# Patient Record
(demographics unavailable — no encounter records)

---

## 2025-01-31 NOTE — HISTORY OF PRESENT ILLNESS
[Mother] : mother [Father] : father [Well-balanced] : well-balanced [Fruit] : fruit [Cereal] : cereal [Eggs] : eggs [Water] : water [Normal] : Normal [Sippy Cup use] : sippy cup use [Water heater temperature set at <120 degrees F] : Water heater temperature set at <120 degrees F [Rear facing car seat in  back seat] : Rear facing car seat in  back seat [Carbon Monoxide Detectors] : Carbon monoxide detectors [Smoke Detectors] : Smoke detectors [Peanut] : no peanut [In Crib] : sleeps in crib [On back] : sleeps on back [Co-sleeping] : no co-sleeping [Wakes up at night] : does not wake up at night [Sleeps 12-16 hours per 24 hours (including naps)] : sleeps 12-16 hours per 24 hours (including naps) [Loose bedding, pillow, toys, and/or bumpers in crib] : no loose bedding, pillow, toys, and/or bumpers in crib [Brushing teeth] : brushing teeth [Vitamin] : Primary Fluoride Source: Vitamin [No] : Not at  exposure [Up to date] : Up to date [FreeTextEntry7] : 9m well visit [de-identified] : breast feeding ad [facility] [de-identified] : water with sippy cup  [FreeTextEntry1] : No specialists

## 2025-01-31 NOTE — PLAN
[TextEntry] : Continue breast milk or formula as desired. Increase table foods and with family mealtimes, 3 meals with 2-3 snacks per day. Discussed weaning of bottle and pacifier.   When in car, patient should be in rear-facing car seat in back seat, do not leave baby in car for any reason or any time. Put baby to sleep on back, in own crib with no loose or soft bedding, no co-sleeping. Can use sunscreen if if outside, do not put baby in direct sunlight. Help baby to develop consistent sleep and feeding routines. May offer pacifier if needed. Avoid screen/tv time at this age. Wash hands often, especially after changing diaper. Keep tobacco/vape free household and environment. Encourage family/caregivers to get protective vaccinations. Use sunscreen when outside, avoid direct sunlight. Recognize stranger anxiety. Ensure home is safe due to baby being increasingly mobile. Do not leave baby alone. Use consistent, positive discipline. Read and sing aloud to baby.   No propping bottles, and no bottles in bed, wash/clean teeth with wash cloth or a soft toothbrush Do not give baby honey. Avoid sweetened drinks/juices. Avoid choking by limiting finger foods to smaller than a cheerio. No hard/compressible foods. start daily multivitamin with fluoride.    Return for 12 month well visit or sooner if needed or if concerns arise.

## 2025-01-31 NOTE — DISCUSSION/SUMMARY
[Normal Growth] : growth [Normal Development] : development [None] : No known medical problems [No Elimination Concerns] : elimination [No Feeding Concerns] : feeding [No Skin Concerns] : skin [Normal Sleep Pattern] : sleep [Family Adaptation] : family adaptation [Infant [place]] : infant independence [Feeding Routine] : feeding routine [Safety] : safety [No Medications] : ~He/She~ is not on any medications [Parent/Guardian] : parent/guardian [FreeTextEntry1] : defer covid

## 2025-01-31 NOTE — HISTORY OF PRESENT ILLNESS
[Mother] : mother [Father] : father [Well-balanced] : well-balanced [Fruit] : fruit [Cereal] : cereal [Eggs] : eggs [Water] : water [Normal] : Normal [Sippy Cup use] : sippy cup use [Water heater temperature set at <120 degrees F] : Water heater temperature set at <120 degrees F [Rear facing car seat in  back seat] : Rear facing car seat in  back seat [Carbon Monoxide Detectors] : Carbon monoxide detectors [Smoke Detectors] : Smoke detectors [Peanut] : no peanut [In Crib] : sleeps in crib [On back] : sleeps on back [Co-sleeping] : no co-sleeping [Wakes up at night] : does not wake up at night [Sleeps 12-16 hours per 24 hours (including naps)] : sleeps 12-16 hours per 24 hours (including naps) [Loose bedding, pillow, toys, and/or bumpers in crib] : no loose bedding, pillow, toys, and/or bumpers in crib [Brushing teeth] : brushing teeth [Vitamin] : Primary Fluoride Source: Vitamin [No] : Not at  exposure [Up to date] : Up to date [FreeTextEntry7] : 9m well visit [de-identified] : breast feeding ad [facility] [de-identified] : water with sippy cup  [FreeTextEntry1] : No specialists

## 2025-01-31 NOTE — PHYSICAL EXAM
[Alert] : alert [Acute Distress] : no acute distress [Consolable] : consolable [Playful] : playful [Normocephalic] : normocephalic [Flat Open Anterior [place]] : flat open anterior fontanelle [Red Reflex] : red reflex bilateral [Excessive Tearing] : no excessive tearing [PERRL] : PERRL [Normally Placed Ears] : normally placed ears [Auricles Well Formed] : auricles well formed [Clear Tympanic membranes] : clear tympanic membranes [Light reflex present] : light reflex present [Bony landmarks visible] : bony landmarks visible [Discharge] : no discharge [Nares Patent] : nares patent [Palate Intact] : palate intact [Uvula Midline] : uvula midline [Supple, full passive range of motion] : supple, full passive range of motion [Palpable Masses] : no palpable masses [Symmetric Chest Rise] : symmetric chest rise [Clear to Auscultation Bilaterally] : clear to auscultation bilaterally [Regular Rate and Rhythm] : regular rate and rhythm [S1, S2 present] : S1, S2 present [Murmurs] : no murmurs [+2 Femoral Pulses] : (+) 2 femoral pulses [Soft] : soft [Tender] : nontender [Distended] : nondistended [Bowel Sounds] : bowel sounds present [Hepatomegaly] : no hepatomegaly [Splenomegaly] : no splenomegaly [Normal External Genitalia] : normal external genitalia [Clitoromegaly] : no clitoromegaly [Normal Vaginal Introitus] : normal vaginal introitus [No Abnormal Lymph Nodes Palpated] : no abnormal lymph nodes palpated [Symmetric abduction and rotation of hips] : symmetric abduction and rotation of hips [Allis Sign] : negative Allis sign [Straight] : straight [Cranial Nerves Grossly Intact] : cranial nerves grossly intact [Rash or Lesions] : no rash/lesions

## 2025-01-31 NOTE — DISCUSSION/SUMMARY
[Normal Growth] : growth [Normal Development] : development [None] : No known medical problems [No Elimination Concerns] : elimination [No Feeding Concerns] : feeding [No Skin Concerns] : skin [Normal Sleep Pattern] : sleep [Family Adaptation] : family adaptation [Infant [place]] : infant independence [Feeding Routine] : feeding routine [Safety] : safety [No Medications] : ~He/She~ is not on any medications [Parent/Guardian] : parent/guardian [FreeTextEntry1] : defer covid

## 2025-01-31 NOTE — PHYSICAL EXAM
[Alert] : alert [Acute Distress] : no acute distress [Consolable] : consolable [Playful] : playful [Normocephalic] : normocephalic [Flat Open Anterior [place]] : flat open anterior fontanelle [Red Reflex] : red reflex bilateral [Excessive Tearing] : no excessive tearing [PERRL] : PERRL [Normally Placed Ears] : normally placed ears [Auricles Well Formed] : auricles well formed [Clear Tympanic membranes] : clear tympanic membranes [Light reflex present] : light reflex present [Bony landmarks visible] : bony landmarks visible [Discharge] : no discharge [Nares Patent] : nares patent [Palate Intact] : palate intact [Uvula Midline] : uvula midline [Supple, full passive range of motion] : supple, full passive range of motion [Palpable Masses] : no palpable masses [Symmetric Chest Rise] : symmetric chest rise [Clear to Auscultation Bilaterally] : clear to auscultation bilaterally [Regular Rate and Rhythm] : regular rate and rhythm [S1, S2 present] : S1, S2 present [Murmurs] : no murmurs [+2 Femoral Pulses] : (+) 2 femoral pulses [Soft] : soft [Tender] : nontender [Distended] : nondistended [Bowel Sounds] : bowel sounds present [Hepatomegaly] : no hepatomegaly [Splenomegaly] : no splenomegaly [Normal External Genitalia] : normal external genitalia [Clitoromegaly] : no clitoromegaly [Normal Vaginal Introitus] : normal vaginal introitus [No Abnormal Lymph Nodes Palpated] : no abnormal lymph nodes palpated [Symmetric abduction and rotation of hips] : symmetric abduction and rotation of hips [Allis Sign] : negative Allis sign [Straight] : straight [Cranial Nerves Grossly Intact] : cranial nerves grossly intact [Rash or Lesions] : no rash/lesions

## 2025-03-17 NOTE — HISTORY OF PRESENT ILLNESS
[de-identified] : fevers started this afternoon [FreeTextEntry6] : no other symptoms eating and drinking at baseline.

## 2025-03-25 NOTE — PLAN
[TextEntry] : Continue breast feeding if applicable, or transition to whole milk, limit to 16-18 oz. a day to prevent iron deficiency anemia. Continue table foods to 3x/day. Increase to 2-3 servings of proteins a day and offer wide variety of textures and healthy food groups. Encourage sitting and eating meals at table with family. Incorporate water with sippy cup and avoid sweetened juices/drinks.   When in car, patient should be in rear-facing car seat in back seat up to 2 years old, do not leave baby in car for any reason or any time. Put baby to sleep on back, in own crib with no loose or soft bedding, no co-sleeping. Help baby to develop consistent sleep and feeding routines, daily routines such as brushing and cleaning teeth twice daily after breakfast and before bed. Continue to allow for 1 nap/day. Avoid screen/tv time at this age. Wash hands often, especially after changing diaper. Keep tobacco/vape free household and environment. Encourage family/caregivers to get protective vaccinations. Use sunscreen when outside, avoid direct sunlight. Recognize stranger and separation anxiety. Ensure home is safe due to baby being increasingly mobile. Do not leave baby alone. Use consistent, positive discipline. Read and sing aloud to baby.   No propping bottles, and no bottles in bed, wash/clean teeth with wash cloth or a soft toothbrush Avoid choking by limiting finger foods to smaller than a cheerio. No hard/compressible foods. Continue daily multivitamin with fluoride.   Return in 2 weeks for Prevnar, MMR, and Hep A   Return for 15 month well visit or sooner if needed or if concerns arise.

## 2025-03-25 NOTE — DEVELOPMENTAL MILESTONES
[Normal Development] : Normal Development [None] : none [Looks for hidden objects] : looks for hidden objects [Imitates new gestures] : imitates new gestures [Says "Dad" or "Mom" with meaning] : says "Dad" or "Mom" with meaning [Uses one word other than Mom or] : uses one word other than Mom or Dad or personal names [Follows a verbal command that] : follows a verbal command that includes a gesture [Takes first independent] : takes first independent steps [Stands without support] : stands without support [Picks up small object with 2 finger] : picks up small object with 2 finger pincer grasp [Picks up food and eats it] : picks up food and eats it [FreeTextEntry1] : reviewed [place]

## 2025-03-25 NOTE — HISTORY OF PRESENT ILLNESS
[Parents] : parents [Fruit] : fruit [Vegetables] : vegetables [Meat] : meat [Dairy] : dairy [Finger food] : finger food [Table food] : table food [Normal] : Normal [Vitamin] : Primary Fluoride Source: Vitamin [Playtime] : Playtime  [No] : Not at  exposure [Water heater temperature set at <120 degrees F] : Water heater temperature set at <120 degrees F [Car seat in back seat] : Car seat in back seat [Smoke Detectors] : Smoke detectors [Exposure to electronic nicotine delivery system] : No exposure to electronic nicotine delivery system [Carbon Monoxide Detectors] : Carbon monoxide detectors [At risk for exposure to TB] : Not at risk for exposure to Tuberculosis [Up to date] : Up to date [FreeTextEntry7] : 12 month well [de-identified] : eating very well, still breast feeding or breast milk in bottle about 2-3 times a day or ad [facility]  [FreeTextEntry1] : No concerns, doing well    Been having congestion and lingering viral illness with fever few days ago, want to hold vaccinations today ad will return in 2-3 weeks for vaccines due at 12 month visit

## 2025-03-25 NOTE — HISTORY OF PRESENT ILLNESS
[Parents] : parents [Fruit] : fruit [Vegetables] : vegetables [Meat] : meat [Dairy] : dairy [Finger food] : finger food [Table food] : table food [Normal] : Normal [Vitamin] : Primary Fluoride Source: Vitamin [Playtime] : Playtime  [No] : Not at  exposure [Water heater temperature set at <120 degrees F] : Water heater temperature set at <120 degrees F [Car seat in back seat] : Car seat in back seat [Smoke Detectors] : Smoke detectors [Exposure to electronic nicotine delivery system] : No exposure to electronic nicotine delivery system [Carbon Monoxide Detectors] : Carbon monoxide detectors [At risk for exposure to TB] : Not at risk for exposure to Tuberculosis [Up to date] : Up to date [FreeTextEntry7] : 12 month well [de-identified] : eating very well, still breast feeding or breast milk in bottle about 2-3 times a day or ad [facility]  [FreeTextEntry1] : No concerns, doing well    Been having congestion and lingering viral illness with fever few days ago, want to hold vaccinations today ad will return in 2-3 weeks for vaccines due at 12 month visit

## 2025-03-25 NOTE — PHYSICAL EXAM
[Alert] : alert [Normocephalic] : normocephalic [Closed Anterior [place]] : closed anterior fontanelle [Red Reflex] : red reflex bilateral [PERRL] : PERRL [Normally Placed Ears] : normally placed ears [Auricles Well Formed] : auricles well formed [Clear Tympanic membranes] : clear tympanic membranes [Light reflex present] : light reflex present [Bony landmarks visible] : bony landmarks visible [Discharge] : no discharge [Nares Patent] : nares patent [Palate Intact] : palate intact [Uvula Midline] : uvula midline [Tooth Eruption] : tooth eruption [Supple, full passive range of motion] : supple, full passive range of motion [Palpable Masses] : no palpable masses [Symmetric Chest Rise] : symmetric chest rise [Clear to Auscultation Bilaterally] : clear to auscultation bilaterally [Regular Rate and Rhythm] : regular rate and rhythm [S1, S2 present] : S1, S2 present [Murmurs] : no murmurs [+2 Femoral Pulses] : (+) 2 femoral pulses [Soft] : soft [Tender] : nontender [Distended] : nondistended [Bowel Sounds] : normoactive bowel sounds [Hepatomegaly] : no hepatomegaly [Splenomegaly] : no splenomegaly [Normal External Genitalia] : normal external genitalia [Clitoromegaly] : no clitoromegaly [Normal Vaginal Introitus] : normal vaginal introitus [No Abnormal Lymph Nodes Palpated] : no abnormal lymph nodes palpated [Symmetric Abduction and Rotation of Hips] : symmetric abduction and rotation of hips [Allis Sign] : negative Allis sign [Straight] : straight [Cranial Nerves Grossly Intact] : cranial nerves grossly intact [Rash or Lesions] : no rash/lesions

## 2025-03-25 NOTE — PHYSICAL EXAM
[Alert] : alert [Normocephalic] : normocephalic [Closed Anterior [place]] : closed anterior fontanelle [Red Reflex] : red reflex bilateral [PERRL] : PERRL [Normally Placed Ears] : normally placed ears [Auricles Well Formed] : auricles well formed [Clear Tympanic membranes] : clear tympanic membranes [Light reflex present] : light reflex present [Bony landmarks visible] : bony landmarks visible [Discharge] : no discharge [Nares Patent] : nares patent [Palate Intact] : palate intact [Uvula Midline] : uvula midline [Tooth Eruption] : tooth eruption [Supple, full passive range of motion] : supple, full passive range of motion [Palpable Masses] : no palpable masses [Symmetric Chest Rise] : symmetric chest rise [Clear to Auscultation Bilaterally] : clear to auscultation bilaterally [Regular Rate and Rhythm] : regular rate and rhythm [S1, S2 present] : S1, S2 present [Murmurs] : no murmurs [+2 Femoral Pulses] : (+) 2 femoral pulses [Soft] : soft [Tender] : nontender [Distended] : nondistended [Bowel Sounds] : normoactive bowel sounds [Hepatomegaly] : no hepatomegaly [Splenomegaly] : no splenomegaly [Normal External Genitalia] : normal external genitalia [Clitoromegaly] : no clitoromegaly [Normal Vaginal Introitus] : normal vaginal introitus [No Abnormal Lymph Nodes Palpated] : no abnormal lymph nodes palpated [Symmetric Abduction and Rotation of Hips] : symmetric abduction and rotation of hips [Allis Sign] : negative Allis sign [Straight] : straight [Cranial Nerves Grossly Intact] : cranial nerves grossly intact [Rash or Lesions] : no rash/lesions

## 2025-07-10 NOTE — HISTORY OF PRESENT ILLNESS
[Parents] : parents [Vitamin] : Primary Fluoride Source: Vitamin [No] : Not at  exposure [Water heater temperature set at <120 degrees F] : Water heater temperature set at <120 degrees F [Car seat in back seat] : Car seat in back seat [Carbon Monoxide Detectors] : Carbon monoxide detectors [Smoke Detectors] : Smoke detectors [NO] : No